# Patient Record
Sex: MALE | Race: WHITE | Employment: FULL TIME | URBAN - METROPOLITAN AREA
[De-identification: names, ages, dates, MRNs, and addresses within clinical notes are randomized per-mention and may not be internally consistent; named-entity substitution may affect disease eponyms.]

---

## 2017-08-07 ASSESSMENT — ENCOUNTER SYMPTOMS
SKIN CHANGES: 0
NAIL CHANGES: 0
POOR WOUND HEALING: 0

## 2017-08-16 ENCOUNTER — MYC REFILL (OUTPATIENT)
Dept: INTERNAL MEDICINE | Facility: CLINIC | Age: 32
End: 2017-08-16

## 2017-08-16 DIAGNOSIS — N52.01 ERECTILE DYSFUNCTION DUE TO ARTERIAL INSUFFICIENCY: ICD-10-CM

## 2017-08-16 DIAGNOSIS — F41.1 GAD (GENERALIZED ANXIETY DISORDER): ICD-10-CM

## 2017-08-16 ASSESSMENT — ACTIVITIES OF DAILY LIVING (ADL)
ARE_THERE_FIREARMS_IN_YOUR_HOME?: Y
DO_MEMBERS_OF_YOUR_HOUSEHOLD_USE_SUNSCREEN?: Y
DO_MEMBERS_OF_YOUR_HOUSEHOLD_USE_SAFETY_HELMETS?: N
DO_MEMBERS_OF_YOUR_HOUSEHOLD_WEAR_SEAT_BELTS?: Y
ARE_THERE_SMOKE_DETECTORS_IN_YOUR_HOME?: Y
ARE_THERE_CARBON_MONOXIDE_DETECTORS_IN_YOUR_HOME?: Y

## 2017-08-17 RX ORDER — LORAZEPAM 0.5 MG/1
0.5 TABLET ORAL DAILY PRN
Qty: 30 TABLET | Refills: 0 | Status: CANCELLED | OUTPATIENT
Start: 2017-08-17

## 2017-08-17 RX ORDER — SILDENAFIL 100 MG/1
100 TABLET, FILM COATED ORAL DAILY PRN
Qty: 16 TABLET | Refills: 4 | Status: CANCELLED | OUTPATIENT
Start: 2017-08-17

## 2017-08-17 NOTE — TELEPHONE ENCOUNTER
Message from MyChart:  Original authorizing provider: MD Matt Daly would like a refill of the following medications:  LORazepam (ATIVAN) 0.5 MG tablet [Messi Serna MD]    Preferred pharmacy: [facility] #[id number] - [place], [place] - [address]    Comment:

## 2017-08-17 NOTE — TELEPHONE ENCOUNTER
Lorazepam 0.5mg      Last Written Prescription Date:  5/24/16  Last Fill Quantity: 30,   # refills: 0  Last Office Visit with [facility] or  Health prescribing provider: 5/24/16  Future Office visit:       Pt does not have CSA on file.     Viagra 100mg      Last Written Prescription Date: 5/24/16  Last Fill Quantity: 16,  # refills: 4   Last Office Visit with [facility], [facility] or  [facility] prescribing provider: 5/24/16                                              Mychart message to pt advising him to schedule appt to establish care with a new provider. Pt advised he may need to be seen before refills will be authorized. Refill request routed to covering provider to review.    Routing refill request to provider for review/approval because:  Drug not on the [facility], [facility] or  [facility] refill protocol or controlled substance   Patient needs to be seen because it has been more than 1 year since last office visit.

## 2017-08-21 ENCOUNTER — OFFICE VISIT (OUTPATIENT)
Dept: INTERNAL MEDICINE | Facility: CLINIC | Age: 32
End: 2017-08-21

## 2017-08-21 VITALS
WEIGHT: 177.7 LBS | HEIGHT: 72 IN | TEMPERATURE: 97.4 F | OXYGEN SATURATION: 98 % | DIASTOLIC BLOOD PRESSURE: 76 MMHG | HEART RATE: 88 BPM | SYSTOLIC BLOOD PRESSURE: 110 MMHG | BODY MASS INDEX: 24.07 KG/M2

## 2017-08-21 DIAGNOSIS — R25.3 FLUTTERING MUSCLES: ICD-10-CM

## 2017-08-21 DIAGNOSIS — Z79.899 HIGH RISK MEDICATION USE: Primary | ICD-10-CM

## 2017-08-21 DIAGNOSIS — Z79.899 HIGH RISK MEDICATION USE: ICD-10-CM

## 2017-08-21 DIAGNOSIS — N52.01 ERECTILE DYSFUNCTION DUE TO ARTERIAL INSUFFICIENCY: ICD-10-CM

## 2017-08-21 DIAGNOSIS — F41.1 GAD (GENERALIZED ANXIETY DISORDER): ICD-10-CM

## 2017-08-21 DIAGNOSIS — B07.9 VIRAL WARTS, UNSPECIFIED TYPE: ICD-10-CM

## 2017-08-21 LAB
ALBUMIN SERPL-MCNC: 3.9 G/DL (ref 3.4–5)
ALP SERPL-CCNC: 82 U/L (ref 40–150)
ALT SERPL W P-5'-P-CCNC: 16 U/L (ref 0–70)
ANION GAP SERPL CALCULATED.3IONS-SCNC: 6 MMOL/L (ref 3–14)
AST SERPL W P-5'-P-CCNC: 16 U/L (ref 0–45)
BASOPHILS # BLD AUTO: 0 10E9/L (ref 0–0.2)
BASOPHILS NFR BLD AUTO: 0.5 %
BILIRUB SERPL-MCNC: 1.3 MG/DL (ref 0.2–1.3)
BUN SERPL-MCNC: 17 MG/DL (ref 7–30)
CALCIUM SERPL-MCNC: 8.8 MG/DL (ref 8.5–10.1)
CHLORIDE SERPL-SCNC: 102 MMOL/L (ref 94–109)
CHOLEST SERPL-MCNC: 110 MG/DL
CO2 SERPL-SCNC: 29 MMOL/L (ref 20–32)
CREAT SERPL-MCNC: 0.88 MG/DL (ref 0.66–1.25)
DIFFERENTIAL METHOD BLD: ABNORMAL
EOSINOPHIL # BLD AUTO: 0.1 10E9/L (ref 0–0.7)
EOSINOPHIL NFR BLD AUTO: 1.5 %
ERYTHROCYTE [DISTWIDTH] IN BLOOD BY AUTOMATED COUNT: 12.5 % (ref 10–15)
ERYTHROCYTE [SEDIMENTATION RATE] IN BLOOD BY WESTERGREN METHOD: 3 MM/H (ref 0–15)
GFR SERPL CREATININE-BSD FRML MDRD: >90 ML/MIN/1.7M2
GLUCOSE SERPL-MCNC: 98 MG/DL (ref 70–99)
HCT VFR BLD AUTO: 44.5 % (ref 40–53)
HDLC SERPL-MCNC: 42 MG/DL
HGB BLD-MCNC: 15.5 G/DL (ref 13.3–17.7)
IMM GRANULOCYTES # BLD: 0 10E9/L (ref 0–0.4)
IMM GRANULOCYTES NFR BLD: 0.3 %
INTERPRETATION ECG - MUSE: NORMAL
LDLC SERPL CALC-MCNC: 51 MG/DL
LYMPHOCYTES # BLD AUTO: 1.4 10E9/L (ref 0.8–5.3)
LYMPHOCYTES NFR BLD AUTO: 34.8 %
MCH RBC QN AUTO: 32.6 PG (ref 26.5–33)
MCHC RBC AUTO-ENTMCNC: 34.8 G/DL (ref 31.5–36.5)
MCV RBC AUTO: 94 FL (ref 78–100)
MONOCYTES # BLD AUTO: 0.5 10E9/L (ref 0–1.3)
MONOCYTES NFR BLD AUTO: 12.4 %
NEUTROPHILS # BLD AUTO: 2 10E9/L (ref 1.6–8.3)
NEUTROPHILS NFR BLD AUTO: 50.5 %
NONHDLC SERPL-MCNC: 69 MG/DL
NRBC # BLD AUTO: 0 10*3/UL
NRBC BLD AUTO-RTO: 0 /100
PLATELET # BLD AUTO: 119 10E9/L (ref 150–450)
POTASSIUM SERPL-SCNC: 3.7 MMOL/L (ref 3.4–5.3)
PROT SERPL-MCNC: 7.4 G/DL (ref 6.8–8.8)
RBC # BLD AUTO: 4.76 10E12/L (ref 4.4–5.9)
SODIUM SERPL-SCNC: 137 MMOL/L (ref 133–144)
TRIGL SERPL-MCNC: 89 MG/DL
TSH SERPL DL<=0.005 MIU/L-ACNC: 1.58 MU/L (ref 0.4–4)
WBC # BLD AUTO: 3.9 10E9/L (ref 4–11)

## 2017-08-21 RX ORDER — SILDENAFIL 100 MG/1
100 TABLET, FILM COATED ORAL DAILY PRN
Qty: 32 TABLET | Refills: 4 | Status: SHIPPED | OUTPATIENT
Start: 2017-08-21

## 2017-08-21 RX ORDER — LORAZEPAM 0.5 MG/1
0.5 TABLET ORAL DAILY PRN
Qty: 30 TABLET | Refills: 0 | Status: SHIPPED | OUTPATIENT
Start: 2017-08-21 | End: 2018-10-02

## 2017-08-21 RX ORDER — EMTRICITABINE AND TENOFOVIR DISOPROXIL FUMARATE 200; 300 MG/1; MG/1
TABLET, FILM COATED ORAL
COMMUNITY

## 2017-08-21 ASSESSMENT — PAIN SCALES - GENERAL: PAINLEVEL: NO PAIN (0)

## 2017-08-21 NOTE — PROGRESS NOTES
"HPI  31-year-old male presents today to establish care and for annual physical examination. He has a couple of concerns. The first is a plantar wart on the bottom of his right great toe which has been present for several months. He's tried no home remedies to try and clear this up. He's had a history of warts in the past and these have typically resolved with liquid nitrogen. He's also experienced a several month history of \"fluttering\" this is in the substernal area it occurs spontaneously with no precipitating or alleviating factors that he has been able to identify. It'll last for several minutes and then spontaneously resolve. It occasionally is associated with a sensation of lightheadedness and dyspnea. Does not appear to be correlated with heartbeat. He is not aware of any rapid or irregular heart rhythms. It has not increased in frequency or severity and continues to occur several times during the day. It can occur with exercise but it doesn't stop him from exercising. There is no associated chest pain syncope or dizziness. Has not had any history of similar issues. He does have a history of anxiety for which he takes lorazepam on a p.r.n. basis less now by his report. He was last given 30 lorazepam back in May of last year and he is now requesting a refill. Otherwise he tries to be healthy with a focus on fruits and vegetables. No other complaints or concerns. Family history is significant for hypothyroidism in his mother.  Past Medical History:   Diagnosis Date     Acne      Anxiety      Mild major depression (H)      Past Surgical History:   Procedure Laterality Date     NONE       Family History   Problem Relation Age of Onset     Breast Cancer Mother      brest cancer @ age 31 and @ age60     Family History Negative Father      born 1954     Multiple Sclerosis Other      Family History Negative Brother      DIABETES Maternal Grandmother      Hypertension Maternal Grandmother      CANCER Maternal " "Grandfather      testicular     Family History Negative Paternal Grandmother      CANCER Paternal Grandfather      skin     Cardiovascular Paternal Grandfather      Social History     Social History     Marital status: Single     Spouse name: N/A     Number of children: N/A     Years of education: N/A     Social History Main Topics     Smoking status: Never Smoker     Smokeless tobacco: Never Used     Alcohol use Yes      Comment: socially     Drug use: No     Sexual activity: Yes     Other Topics Concern     None     Social History Narrative       Exam:  /76  Pulse 88  Temp 97.4  F (36.3  C) (Oral)  Ht 1.84 m (6' 0.44\")  Wt 80.6 kg (177 lb 11.2 oz)  SpO2 98%  BMI 23.81 kg/m2  177 lbs 11.2 oz  HPI    Past Medical History:   Diagnosis Date     Acne      Anxiety      Mild major depression (H)      Past Surgical History:   Procedure Laterality Date     NONE       Family History   Problem Relation Age of Onset     Breast Cancer Mother      brest cancer @ age 31 and @ age60     Family History Negative Father      born 1954     Multiple Sclerosis Other      Family History Negative Brother      DIABETES Maternal Grandmother      Hypertension Maternal Grandmother      CANCER Maternal Grandfather      testicular     Family History Negative Paternal Grandmother      CANCER Paternal Grandfather      skin     Cardiovascular Paternal Grandfather      Social History     Social History     Marital status: Single     Spouse name: N/A     Number of children: N/A     Years of education: N/A     Social History Main Topics     Smoking status: Never Smoker     Smokeless tobacco: Never Used     Alcohol use Yes      Comment: socially     Drug use: No     Sexual activity: Yes     Other Topics Concern     None     Social History Narrative     Answers for HPI/ROS submitted by the patient on 8/7/2017   Annual Exam:  General Symptoms: No  Skin Symptoms: Yes  HENT Symptoms: No  EYE SYMPTOMS: No  HEART SYMPTOMS: No  LUNG SYMPTOMS: " "No  INTESTINAL SYMPTOMS: No  URINARY SYMPTOMS: No  REPRODUCTIVE SYMPTOMS: No  SKELETAL SYMPTOMS: No  BLOOD SYMPTOMS: No  NERVOUS SYSTEM SYMPTOMS: No  MENTAL HEALTH SYMPTOMS: No  Changes in hair: No  Changes in moles/birth marks: No  Itching: No  Rashes: No  Changes in nails: No  Acne: No  Change in facial hair: No  Warts: Yes  Non-healing sores: No  Scarring: No  Flaking of skin: No  Color changes of hands/feet in cold : No  Sun sensitivity: No  Skin thickening: No  Frequency of exercise:: 2-3 days/week  Duration of exercise:: 30-45 minutes    Exam:  /76  Pulse 88  Temp 97.4  F (36.3  C) (Oral)  Ht 1.84 m (6' 0.44\")  Wt 80.6 kg (177 lb 11.2 oz)  SpO2 98%  BMI 23.81 kg/m2  177 lbs 11.2 oz  Physical Exam   The patient is alert, oriented with a clear sensorium.   Skin shows no lesions or rashes and good turgor.   Head is normocephalic and atraumatic.   Eyes show PERRLA with benign optic fundi.   Ears show normal TMs bilaterally.   Mouth shows clear oral mucosa.   Neck shows no nodes, thyromegaly or bruits but tender thyroid without nodules.   Back is non tender.  Lungs are clear to percussion and auscultation.   Heart shows normal S1 and S2 without murmur or gallop.   Abdomen is soft and nontender without masses or organomegaly.   Genitalia show normal testes. No evidence of inguinal hernia.  Extremities show no edema and no evidence of active synovitis, wart on rt great toe treated with liquid N2.   Neurologic examination shows cranial nerves II-XII intact. Motor shows 5/5 strength. Reflexes are symmetric. Cerebellar testing shows normal tandem gait.  Romberg negative.  EKG reviewed with him and was normal.      ASSESSMENT  1 suspect subacute thyroiditis responsible for his symptoms of \"fluttering\"  2 wart status post cryotherapy  3 chronic anxiety on occasional lorazepam  4 erectile dysfunction treated with sildenafil    Plan  I did refill his lorazepam and his sildenafil. Reassess his labs including " sedimentation rate and TSH in light of his thyroid tenderness. Reassured him that I would expect the symptoms to resolve over the next month or 2. If not may require additional investigation.  We'll update his immunizations with TDAP      Wilton Cunningham MD  General Internal Medicine  Primary Care Center  [phone]

## 2017-08-21 NOTE — MR AVS SNAPSHOT
After Visit Summary   8/21/2017    Matt Keita    MRN: [id number]           Patient Information     Date Of Birth          1985        Visit Information        Provider Department      8/21/2017 7:20 AM Wilton Cunningham MD [facility]        Today's Diagnoses     High risk medication use    -  1    DAMON (generalized anxiety disorder)        Erectile dysfunction due to arterial insufficiency        Fluttering muscles        Viral warts, unspecified type           Follow-ups after your visit        Your next 10 appointments already scheduled     Aug 21, 2017  8:30 AM CDT   LAB with  LAB   [facility] ([facility])    [address] [address] [phone]           Patient must bring picture ID. Patient should be prepared to give a urine specimen  Please do not eat 10-12 hours before your appointment if you are coming in fasting for labs on lipids, cholesterol, or glucose (sugar). Pregnant women should follow their Care Team instructions. Water with medications is okay. Do not drink coffee or other fluids. If you have concerns about taking  your medications, please ask at office or if scheduling via [employer], send a message by clicking on Secure Messaging, Message Your Care Team.              Future tests that were ordered for you today     Open Future Orders        Priority Expected Expires Ordered    TSH with free T4 reflex Routine  8/21/2018 8/21/2017    Erythrocyte sedimentation rate auto Routine  8/21/2018 8/21/2017    Lipid Profile Routine  8/21/2018 8/21/2017    Comprehensive metabolic panel Routine  8/21/2018 8/21/2017    CBC with platelets differential Routine  8/21/2018 8/21/2017            Who to contact     Please call your clinic at [phone] to:    Ask questions about your health    Make or cancel appointments    Discuss your medicines    Learn about your test results    Speak to your doctor  "  If you have compliments or concerns about an experience at your clinic, or if you wish to file a complaint, please contact [facility] Patient Relations at [phone] or email us at [email or web address]         Additional Information About Your Visit        MyChart Information     [employer] gives you secure access to your electronic health record. If you see a primary care provider, you can also send messages to your care team and make appointments. If you have questions, please call your primary care clinic.  If you do not have a primary care provider, please call [phone] and they will assist you.      [employer] is an electronic gateway that provides easy, online access to your medical records. With [employer], you can request a clinic appointment, read your test results, renew a prescription or communicate with your care team.     To access your existing account, please contact your [facility] or call [phone] for assistance.        Care EveryWhere ID     This is your Care EveryWhere ID. This could be used by other organizations to access your [place] medical records  [id number]        Your Vitals Were     Pulse Temperature Height Pulse Oximetry BMI (Body Mass Index)       88 97.4  F (36.3  C) (Oral) 1.84 m (6' 0.44\") 98% 23.81 kg/m2        Blood Pressure from Last 3 Encounters:   08/21/17 110/76   05/24/16 120/78   05/19/15 110/78    Weight from Last 3 Encounters:   08/21/17 80.6 kg (177 lb 11.2 oz)   05/24/16 80.7 kg (178 lb)   05/19/15 80.7 kg (178 lb)              We Performed the Following     EKG Performed in Clinic w/ Provider Reading Fee     TDAP VACCINE (BOOSTRIX)          Where to get your medicines      Some of these will need a paper prescription and others can be bought over the counter.  Ask your nurse if you have questions.     Bring a paper prescription for each of these medications     LORazepam 0.5 MG tablet    " sildenafil 100 MG cap/tab          Primary Care Provider Office Phone # Fax #    Messi Serna [phone]       XXX RESIGNED  [address] [facility] [address]  [facility] [address]        Equal Access to Services     HARDEEPKISHA MADALYN : Hadii sofia ku hadbonnieo Soomaali, waaxda luqadaha, qaybta kaalmada adeegyada, radha pradon rahul brady laYurimichael . So [facility] [phone].    ATENCIÓN: Si habla español, tiene a lopez disposición servicios gratuitos de asistencia lingüística. Llame al [phone].    We comply with applicable federal civil rights laws and [place] laws. We do not discriminate on the basis of race, color, national origin, age, disability sex, sexual orientation or gender identity.            Thank you!     Thank you for choosing [facility]  for your care. Our goal is always to provide you with excellent care. Hearing back from our patients is one way we can continue to improve our services. Please take a few minutes to complete the written survey that you may receive in the mail after your visit with us. Thank you!             Your Updated Medication List - Protect others around you: Learn how to safely use, store and throw away your medicines at www.disposemymeds.org.          This list is accurate as of: 8/21/17  8:05 AM.  Always use your most recent med list.                   Brand Name Dispense Instructions for use Diagnosis    LORazepam 0.5 MG tablet    ATIVAN    30 tablet    Take 1 tablet (0.5 mg) by mouth daily as needed for anxiety    DAMON (generalized anxiety disorder)       sildenafil 100 MG cap/tab    VIAGRA    32 tablet    Take 1 tablet (100 mg) by mouth daily as needed for erectile dysfunction    Erectile dysfunction due to arterial insufficiency       TRUVADA 200-300 MG per tablet   Generic drug:  emtricitabine-tenofovir       High risk medication use

## 2017-08-21 NOTE — NURSING NOTE
EKG preformed results to provider patient tolerated well. Gia Perera LPN at 8:20 AM on 8/21/2017.

## 2017-08-21 NOTE — NURSING NOTE
Patient received TDAP vaccine.  See immunization list for administration details.  Patient tolerated injection well.     Gia Salcedo at 8:19 AM on 8/21/2017.

## 2017-08-21 NOTE — PATIENT INSTRUCTIONS
[facility] Medication Refill Request Information:  * Please contact your pharmacy regarding ANY request for medication refills.  ** [facility] Prescription Fax = [phone]  * Please allow 3 business days for routine medication refills.  * Please allow 5 business days for controlled substance medication refills.     [facility] Test Result notification information:  *You will be notified with in 7-10 days of your appointment day regarding the results of your test.  If you are on MyChart you will be notified as soon as the provider has reviewed the results and signed off on them.    [facility] [phone]

## 2017-08-21 NOTE — NURSING NOTE
Chief Complaint   Patient presents with     Establish Care     Patient here to [facility], physical.     Jaylene Orozco LPN at 7:12 AM on 8/21/2017.

## 2017-10-19 ENCOUNTER — OFFICE VISIT (OUTPATIENT)
Dept: FAMILY MEDICINE | Facility: CLINIC | Age: 32
End: 2017-10-19
Payer: COMMERCIAL

## 2017-10-19 VITALS
BODY MASS INDEX: 24.73 KG/M2 | WEIGHT: 182.6 LBS | DIASTOLIC BLOOD PRESSURE: 72 MMHG | HEART RATE: 84 BPM | TEMPERATURE: 97.3 F | HEIGHT: 72 IN | OXYGEN SATURATION: 100 % | SYSTOLIC BLOOD PRESSURE: 126 MMHG

## 2017-10-19 DIAGNOSIS — Z23 FLU VACCINE NEED: ICD-10-CM

## 2017-10-19 DIAGNOSIS — B07.0 PLANTAR WART OF LEFT FOOT: Primary | ICD-10-CM

## 2017-10-19 PROCEDURE — 90471 IMMUNIZATION ADMIN: CPT | Performed by: FAMILY MEDICINE

## 2017-10-19 PROCEDURE — 90686 IIV4 VACC NO PRSV 0.5 ML IM: CPT | Performed by: FAMILY MEDICINE

## 2017-10-19 PROCEDURE — 17110 DESTRUCTION B9 LES UP TO 14: CPT | Performed by: FAMILY MEDICINE

## 2017-10-19 NOTE — PROGRESS NOTES
SUBJECTIVE:   Matt Keita is a 32 year old male who presents to clinic today for the following health issues:      Chief Complaint   Patient presents with     Wart     Follow Up, RT Big Toe      Patient has  tried over-the counter anti-wart medications.  There is no history of infection or injury.  This is the patient's second treatment.  Discussed various treatments and the patient elects for cryotherapy    O: The patient appears today in no apparent distress.  /72  Pulse 84  Temp 97.3  F (36.3  C) (Oral)  Ht 6' (1.829 m)  Wt 182 lb 9.6 oz (82.8 kg)  SpO2 100%  BMI 24.77 kg/m2  Skin: non-erythematous, raised papule(s) with pinpoint hemmorhages on 1.  Total number being treated today:     A: Common Wart(s).    P:  Each wart was frozen easily three times with liquid nitrogen. The etiology of common warts were discussed.  I recommend the  use the over-the-counter medications such as Compound W under occlusion after healing if there are any small lesions left. Return in about 3 weeks if retreatment seems necessary.

## 2017-10-19 NOTE — NURSING NOTE
Chief Complaint   Patient presents with     Wart     follow up, RT Big Toe       Initial /72  Pulse 84  Temp 97.3  F (36.3  C) (Oral)  Ht 6' (1.829 m)  Wt 182 lb 9.6 oz (82.8 kg)  SpO2 100%  BMI 24.77 kg/m2 Estimated body mass index is 24.77 kg/(m^2) as calculated from the following:    Height as of this encounter: 6' (1.829 m).    Weight as of this encounter: 182 lb 9.6 oz (82.8 kg).  Medication Reconciliation: complete      Health Maintenance that is potentially due pending provider review:  NONE    n/a    LUPILLO Prakash

## 2017-10-19 NOTE — MR AVS SNAPSHOT
After Visit Summary   10/19/2017    Matt Keita    MRN: [id number]           Patient Information     Date Of Birth          1985        Visit Information        Provider Department      10/19/2017 2:00 PM Amilcar Bloom MD [facility]        Today's Diagnoses     Plantar wart of left foot    -  1    Flu vaccine need           Follow-ups after your visit        Who to contact     If you have questions or need follow up information about today's clinic visit or your schedule please contact [facility] directly at [phone].  Normal or non-critical lab and imaging results will be communicated to you by algranohart, letter or phone within 4 business days after the clinic has received the results. If you do not hear from us within 7 days, please contact the clinic through algranohart or phone. If you have a critical or abnormal lab result, we will notify you by phone as soon as possible.  Submit refill requests through [employer] or call your pharmacy and they will forward the refill request to us. Please allow 3 business days for your refill to be completed.          Additional Information About Your Visit        MyChart Information     [employer] gives you secure access to your electronic health record. If you see a primary care provider, you can also send messages to your care team and make appointments. If you have questions, please call your primary care clinic.  If you do not have a primary care provider, please call [phone] and they will assist you.        Care EveryWhere ID     This is your Care EveryWhere ID. This could be used by other organizations to access your [place] medical records  [id number]        Your Vitals Were     Pulse Temperature Height Pulse Oximetry BMI (Body Mass Index)       84 97.3  F (36.3  C) (Oral) 6' (1.829 m) 100% 24.77 kg/m2        Blood Pressure from Last 3 Encounters:   10/19/17 126/72   08/21/17 110/76   05/24/16 120/78    Weight  from Last 3 Encounters:   10/19/17 182 lb 9.6 oz (82.8 kg)   08/21/17 177 lb 11.2 oz (80.6 kg)   05/24/16 178 lb (80.7 kg)              We Performed the Following     DESTRUCT BENIGN LESION, UP TO 14     HC FLU VAC PRESRV FREE QUAD SPLIT VIR 3+YRS IM        Primary Care Provider Office Phone # Fax #    Wilton Helder Cunningham [phone]       [address] [place] [address]        Equal Access to Services     KISHA [facility] CenterTRACI : Hadii aad ku hadasho Soomaali, waaxda luqadaha, qaybta kaalmada adeegyada, waxmacie iglesias . So [facility] [phone].    ATENCIÓN: Si habla español, tiene a lopez disposición servicios gratuitos de asistencia lingüística. LlTri[facility] [phone].    We comply with applicable federal civil rights laws and [place] laws. We do not discriminate on the basis of race, color, national origin, age, disability, sex, sexual orientation, or gender identity.            Thank you!     Thank you for choosing [facility]  for your care. Our goal is always to provide you with excellent care. Hearing back from our patients is one way we can continue to improve our services. Please take a few minutes to complete the written survey that you may receive in the mail after your visit with us. Thank you!             Your Updated Medication List - Protect others around you: Learn how to safely use, store and throw away your medicines at www.disposemymeds.org.          This list is accurate as of: 10/19/17 11:59 PM.  Always use your most recent med list.                   Brand Name Dispense Instructions for use Diagnosis    LORazepam 0.5 MG tablet    ATIVAN    30 tablet    Take 1 tablet (0.5 mg) by mouth daily as needed for anxiety    DAMON (generalized anxiety disorder)       sildenafil 100 MG tablet    VIAGRA    32 tablet    Take 1 tablet (100 mg) by mouth daily as needed for erectile dysfunction    Erectile dysfunction due to arterial insufficiency        TRUVADA 200-300 MG per tablet   Generic drug:  emtricitabine-tenofovir       High risk medication use

## 2018-08-01 ENCOUNTER — HOSPITAL ENCOUNTER (EMERGENCY)
Facility: CLINIC | Age: 33
Discharge: HOME | End: 2018-08-01
Attending: EMERGENCY MEDICINE | Admitting: EMERGENCY MEDICINE
Payer: COMMERCIAL

## 2018-08-01 VITALS
SYSTOLIC BLOOD PRESSURE: 131 MMHG | HEIGHT: 72 IN | BODY MASS INDEX: 23.57 KG/M2 | RESPIRATION RATE: 18 BRPM | WEIGHT: 174 LBS | OXYGEN SATURATION: 100 % | HEART RATE: 88 BPM | DIASTOLIC BLOOD PRESSURE: 88 MMHG | TEMPERATURE: 98.3 F

## 2018-08-01 DIAGNOSIS — N48.89 PENILE BLEEDING: ICD-10-CM

## 2018-08-01 PROCEDURE — 99282 EMERGENCY DEPT VISIT SF MDM: CPT | Performed by: EMERGENCY MEDICINE

## 2018-08-01 PROCEDURE — 99282 EMERGENCY DEPT VISIT SF MDM: CPT | Mod: Z6 | Performed by: EMERGENCY MEDICINE

## 2018-08-01 ASSESSMENT — ENCOUNTER SYMPTOMS
HEMATURIA: 0
DYSURIA: 0
DIFFICULTY URINATING: 0

## 2018-08-01 NOTE — ED TRIAGE NOTES
Pt arrived to the ER after pt had bloody ejaculation after intercourse with his partner about an hour ago. Pt states that his bleeding stopped and was able to urinate here in the ER without pain. Pt reports taking sildenafil yesterday around 9pm. Pt does take truvada due to Pt's partner being exposed and is on triumeq for about 2 years now. VSS And afebrile.

## 2018-08-01 NOTE — DISCHARGE INSTRUCTIONS
Thank you for your patience today.  Please follow-up with your regular doctor in the next 2-3 days for further evaluation and follow-up care.  Please call to schedule an appointment.  Please continue your own medications.  Please return to the ER if you develop high fever, scrotal pain, drainage from your penis, or any worsening of your current symptoms.  It was a pleasure taking care of you today.  We hope you feel better soon.

## 2018-08-01 NOTE — ED AVS SNAPSHOT
[facility] [place], Emergency Department    [address] [place] [address]    Phone:  [phone]                                       Matt Keita   MRN: [id number]    Department:  [facility], Emergency Department   Date of Visit:  8/1/2018           After Visit Summary Signature Page     I have received my discharge instructions, and my questions have been answered. I have discussed any challenges I see with this plan with the nurse or doctor.    ..........................................................................................................................................  Patient/Patient Representative Signature      ..........................................................................................................................................  Patient Representative Print Name and Relationship to Patient    ..................................................               ................................................  Date                                            Time    ..........................................................................................................................................  Reviewed by Signature/Title    ...................................................              ..............................................  Date                                                            Time

## 2018-08-01 NOTE — ED AVS SNAPSHOT
[facility], Emergency Department    [address] [address]    Phone:  [phone]                                       Matt Keita   MRN: [id number]    Department:  [facility], Emergency Department   Date of Visit:  8/1/2018           Patient Information     Date Of Birth          1985        Your diagnoses for this visit were:     Penile bleeding        You were seen by Cherelle Ashraf MD.        Discharge Instructions       Thank you for your patience today.  Please follow-up with your regular doctor in the next 2-3 days for further evaluation and follow-up care.  Please call to schedule an appointment.  Please continue your own medications.  Please return to the ER if you develop high fever, scrotal pain, drainage from your penis, or any worsening of your current symptoms.  It was a pleasure taking care of you today.  We hope you feel better soon.      24 Hour Appointment Hotline       To make an appointment at any [facility] clinic, call [phone] ([phone]). If you don't have a family doctor or clinic, we will help you find one. [facility] clinics are conveniently located to serve the needs of you and your family.             Review of your medicines      Our records show that you are taking the medicines listed below. If these are incorrect, please call your family doctor or clinic.        Dose / Directions Last dose taken    LORazepam 0.5 MG tablet   Commonly known as:  ATIVAN   Dose:  0.5 mg   Quantity:  30 tablet        Take 1 tablet (0.5 mg) by mouth daily as needed for anxiety   Refills:  0        sildenafil 100 MG tablet   Commonly known as:  VIAGRA   Dose:  100 mg   Quantity:  32 tablet        Take 1 tablet (100 mg) by mouth daily as needed for erectile dysfunction   Refills:  4        TRUVADA 200-300 MG per tablet   Generic drug:  emtricitabine-tenofovir        Refills:  0                Orders Needing Specimen Collection     None      Pending Results      No orders found from 7/30/2018 to 8/2/2018.            Pending Culture Results     No orders found from 7/30/2018 to 8/2/2018.            Pending Results Instructions     If you had any lab results that were not finalized at the time of your Discharge, you can call the ED Lab Result RN at [phone]. You will be contacted by this team for any positive Lab results or changes in treatment. The nurses are available 7 days a week from 10A to 6:30P.  You can leave a message 24 hours per day and they will return your call.        Thank you for choosing [facility]       Thank you for choosing [facility] for your care. Our goal is always to provide you with excellent care. Hearing back from our patients is one way we can continue to improve our services. Please take a few minutes to complete the written survey that you may receive in the mail after you visit with us. Thank you!        [employer] Information     [employer] gives you secure access to your electronic health record. If you see a primary care provider, you can also send messages to your care team and make appointments. If you have questions, please call your primary care clinic.  If you do not have a primary care provider, please call [phone] and they will assist you.        Care EveryWhere ID     This is your Care EveryWhere ID. This could be used by other organizations to access your [facility] medical records  [id number]        Equal Access to Services     NATHANIEL BERGMAN : Yariel Costello, wasigifredoda luqadaha, qaybta kaalrashard mayorga, radha arana. So [facility] [phone].    ATENCIÓN: Si habla español, tiene a lopez disposición servicios gratuitos de asistencia lingüística. Llame al [phone].    We comply with applicable federal civil rights laws and [place] laws. We do not discriminate on the basis of race, color, national origin, age, disability, sex, sexual orientation, or gender identity.            After Visit  Summary       This is your record. Keep this with you and show to your community pharmacist(s) and doctor(s) at your next visit.

## 2018-08-01 NOTE — ED PROVIDER NOTES
"  History     Chief Complaint   Patient presents with     Penile Discharge     blood in penis     The history is provided by the patient.     Matt Keita is a 32 year old male with a history of depression, and anxiety who presents to the Emergency Department who presents for evaluation of blood and pain when ejaculating this evening. The patient reports having unprotected intercourse with his boyfriend which triggered his symptoms at climax which also involved a \"popping\" sensation when pulling out. Adding pressure causes discomfort. The patient called the nurse line and was recommended to go to the ED. This has not happened to him in the past. The boyfriend is HIV+ undetectable and is being treated, the patient is also on PrEP and both have missed a dose recently.  The patient used Viagra to enhance pleasure an hour prior intercourse, this was not his first time.The patient denies current pain, denies bruising. He denies traumatic injuries or trauma. He denies dysuria, hematuria or difficulty urinating. The patient has had epididymitis in the past.     I have reviewed the Medications, Allergies, Past Medical and Surgical History, and Social History in the [employer]easydocs system.  Past Medical History:   Diagnosis Date     Acne      Anxiety      Leukopenia      Mild major depression (H)        Past Surgical History:   Procedure Laterality Date     BIOPSY      bone marrow     NONE         Family History   Problem Relation Age of Onset     Breast Cancer Mother      brest cancer @ age 31 and @ age60     Family History Negative Father      born 1954     Multiple Sclerosis Other      Family History Negative Brother      Diabetes Maternal Grandmother      Hypertension Maternal Grandmother      Cancer Maternal Grandfather      testicular     Family History Negative Paternal Grandmother      Cancer Paternal Grandfather      skin     Cardiovascular Paternal Grandfather        Social History   Substance Use Topics     Smoking " status: Never Smoker     Smokeless tobacco: Never Used     Alcohol use Yes      Comment: socially       No current facility-administered medications for this encounter.      Current Outpatient Prescriptions   Medication     emtricitabine-tenofovir (TRUVADA) 200-300 MG per tablet     sildenafil (VIAGRA) 100 MG cap/tab     LORazepam (ATIVAN) 0.5 MG tablet        Allergies   Allergen Reactions     Nkda [No Known Drug Allergies]        Review of Systems   Genitourinary: Positive for penile pain (bloody ejaculation). Negative for difficulty urinating, dysuria and hematuria.   All other systems reviewed and are negative.      Physical Exam   BP: 131/88  Pulse: 88  Temp: 98.3  F (36.8  C)  Resp: 18  Height: 182.9 cm (6')  Weight: 78.9 kg (174 lb)  SpO2: 100 %      Physical Exam   Constitutional: He is oriented to person, place, and time. He appears well-developed. No distress.   HENT:   Head: Normocephalic and atraumatic.   Right Ear: External ear normal.   Left Ear: External ear normal.   Mouth/Throat: Oropharynx is clear and moist.   Eyes: Conjunctivae and EOM are normal. Pupils are equal, round, and reactive to light.   Neck: Normal range of motion. Neck supple.   Cardiovascular: Normal rate, regular rhythm and normal heart sounds.    Pulmonary/Chest: Effort normal and breath sounds normal. No respiratory distress. He has no wheezes. He has no rales.   Abdominal: Soft. He exhibits no distension. There is no tenderness. There is no rebound and no guarding.   Genitourinary: Penis normal. No penile tenderness.   Genitourinary Comments: No penile discharge, no blood at the meatus, penis nontender to palpation with no swelling, no erythema, no ecchymosis, testicles with no swelling, mass, tenderness to palpation.   Musculoskeletal: Normal range of motion. He exhibits no tenderness or deformity.   Neurological: He is alert and oriented to person, place, and time. No cranial nerve deficit. Coordination normal.   Skin: Skin is  warm and dry. No rash noted.   Psychiatric: He has a normal mood and affect. His behavior is normal.       ED Course     ED Course     Procedures             Critical Care time:  none             Labs Ordered and Resulted from Time of ED Arrival Up to the Time of Departure from the ED - No data to display         Assessments & Plan (with Medical Decision Making)     Matt Keita is a 32 year old male with a history of depression, and anxiety who presents to the Emergency Department who presents for evaluation of blood and pain when ejaculating this evening.  Upon arrival patient is well-appearing, afebrile, no distress.  On examination patient with normal penis, no blood at the meatus, no discharge, no tenderness to palpation, no ecchymosis, erythema.  No signs of any trauma, fracture, patient urinating with no difficulty with no blood in the urine. Clinical patient pain/intense popping sensation with ejaculation with no evidence of trauma or dysfunction. At this time no emergent need for antibiotics, imaging.  Discussed with patient to continue his prophylaxis, encourage safe sex with condoms, recommend follow-up with his primary doctor and return if painful erections, swelling, bruising, hematuria or any worsening symptoms.  Patient understands agrees with plan. .The patient is discharged home with instructions to return if their symptoms persist or worsen.  Plan for close follow-up with their primary physician.  I discussed workup, results, treatment, and plan with the patient.  Patient understands and agrees with the plan.      I have reviewed the nursing notes.    I have reviewed the findings, diagnosis, plan and need for follow up with the patient.    Discharge Medication List as of 8/1/2018  1:43 AM          Final diagnoses:   Penile bleeding     I, Erica Brito, am serving as a trained medical scribe to document services personally performed by Cherelle Ashraf MD, based on the provider's statements to  me.   I, Cherelle Ashraf MD, was physically present and have reviewed and verified the accuracy of this note documented by Erica Brito.    8/1/2018   [facility], [place], EMERGENCY DEPARTMENT     Cherelle Ashraf MD  08/01/18 0224

## 2018-09-15 ASSESSMENT — ENCOUNTER SYMPTOMS
VOMITING: 0
DIARRHEA: 0
RECTAL PAIN: 1
NAUSEA: 0
JAUNDICE: 0
PANIC: 0
HYPERTENSION: 0
SLEEP DISTURBANCES DUE TO BREATHING: 0
ORTHOPNEA: 0
BLOATING: 0
DECREASED CONCENTRATION: 0
ABDOMINAL PAIN: 0
LIGHT-HEADEDNESS: 0
HEARTBURN: 0
SYNCOPE: 0
PALPITATIONS: 1
HYPOTENSION: 0
BOWEL INCONTINENCE: 0
LEG PAIN: 0
CONSTIPATION: 0
INSOMNIA: 0
BLOOD IN STOOL: 0
EXERCISE INTOLERANCE: 0
NERVOUS/ANXIOUS: 1
DEPRESSION: 0

## 2018-09-19 ENCOUNTER — OFFICE VISIT (OUTPATIENT)
Dept: INTERNAL MEDICINE | Facility: CLINIC | Age: 33
End: 2018-09-19
Payer: COMMERCIAL

## 2018-09-19 VITALS
TEMPERATURE: 98.1 F | BODY MASS INDEX: 23.68 KG/M2 | OXYGEN SATURATION: 100 % | HEART RATE: 89 BPM | WEIGHT: 174.8 LBS | HEIGHT: 72 IN | RESPIRATION RATE: 18 BRPM | SYSTOLIC BLOOD PRESSURE: 123 MMHG | DIASTOLIC BLOOD PRESSURE: 81 MMHG

## 2018-09-19 DIAGNOSIS — B07.0 PLANTAR WARTS: ICD-10-CM

## 2018-09-19 DIAGNOSIS — Z00.00 ROUTINE HEALTH MAINTENANCE: ICD-10-CM

## 2018-09-19 DIAGNOSIS — K62.89 RECTAL PAIN: ICD-10-CM

## 2018-09-19 DIAGNOSIS — F41.1 GAD (GENERALIZED ANXIETY DISORDER): ICD-10-CM

## 2018-09-19 DIAGNOSIS — E78.5 HYPERLIPIDEMIA, UNSPECIFIED HYPERLIPIDEMIA TYPE: ICD-10-CM

## 2018-09-19 DIAGNOSIS — E78.5 HYPERLIPIDEMIA, UNSPECIFIED HYPERLIPIDEMIA TYPE: Primary | ICD-10-CM

## 2018-09-19 LAB
ALBUMIN SERPL-MCNC: 4.2 G/DL (ref 3.4–5)
ALP SERPL-CCNC: 69 U/L (ref 40–150)
ALT SERPL W P-5'-P-CCNC: 16 U/L (ref 0–70)
AST SERPL W P-5'-P-CCNC: 12 U/L (ref 0–45)
BILIRUB DIRECT SERPL-MCNC: 0.2 MG/DL (ref 0–0.2)
BILIRUB SERPL-MCNC: 1 MG/DL (ref 0.2–1.3)
CHOLEST SERPL-MCNC: 126 MG/DL
ERYTHROCYTE [DISTWIDTH] IN BLOOD BY AUTOMATED COUNT: 12.5 % (ref 10–15)
HCT VFR BLD AUTO: 46 % (ref 40–53)
HDLC SERPL-MCNC: 46 MG/DL
HGB BLD-MCNC: 16 G/DL (ref 13.3–17.7)
LDLC SERPL CALC-MCNC: 63 MG/DL
MCH RBC QN AUTO: 32.1 PG (ref 26.5–33)
MCHC RBC AUTO-ENTMCNC: 34.8 G/DL (ref 31.5–36.5)
MCV RBC AUTO: 92 FL (ref 78–100)
NONHDLC SERPL-MCNC: 80 MG/DL
PLATELET # BLD AUTO: 167 10E9/L (ref 150–450)
PROT SERPL-MCNC: 7.9 G/DL (ref 6.8–8.8)
RBC # BLD AUTO: 4.99 10E12/L (ref 4.4–5.9)
TRIGL SERPL-MCNC: 88 MG/DL
WBC # BLD AUTO: 7.6 10E9/L (ref 4–11)

## 2018-09-19 RX ORDER — GABAPENTIN 100 MG/1
100 CAPSULE ORAL 3 TIMES DAILY
Qty: 270 CAPSULE | Refills: 3 | Status: SHIPPED | OUTPATIENT
Start: 2018-09-19

## 2018-09-19 RX ORDER — GABAPENTIN 100 MG/1
100 CAPSULE ORAL 3 TIMES DAILY
Qty: 270 CAPSULE | Refills: 3 | Status: SHIPPED | OUTPATIENT
Start: 2018-09-19 | End: 2018-09-19

## 2018-09-19 ASSESSMENT — PAIN SCALES - GENERAL: PAINLEVEL: MILD PAIN (3)

## 2018-09-19 NOTE — NURSING NOTE
Chief Complaint   Patient presents with     Physical     Patient is here for physical exam       Jimmy Kelley CMA (AAMA) at 5:31 PM on 9/19/2018

## 2018-09-19 NOTE — PROGRESS NOTES
HPI  33-year-old presents today for physical examination.  He has a couple of concerns.  First is that of generalized anxiety this is been an issue for him for the past several years.  He in the past has been intolerant of SSRIs related to their sexual side effects.  He found that bupropion was ineffective.  He found that venlafaxine actually aggravated the situation and made things worse.  He has a friend whose been taking gabapentin for anxiety with good effect and was inquiring regarding the use of gabapentin.  We reviewed potential side effects and he consented to a trial of this.  He is also had a plantars wart which he would like treated and he has had a one-week history of some rectal pain.  This was after he had receptive intercourse and was associated with an acute episode of pain which has persisted since that time but is gradually improving and decreasing in severity.  There is no associated diarrhea bleeding discharge fever chills sweats urinary symptoms drip discharge or other complaints.  Patient's not had any similar episodes in the past.  He is on Truvada is been tested for HIV and kidney function regularly through [facility].  He has had previous lipids done through his work and this has been associated with low HDL cholesterol by his report.  He is not doing anything in the way of exercise or activity is seen a counselor regarding the anxiety    Past Medical History:   Diagnosis Date     Acne      Anxiety      Leukopenia      Mild major depression (H)      Past Surgical History:   Procedure Laterality Date     BIOPSY      bone marrow     NONE       Family History   Problem Relation Age of Onset     Breast Cancer Mother      brest cancer @ age 31 and @ age60     Family History Negative Father      born 1954     Multiple Sclerosis Other      Family History Negative Brother      Diabetes Maternal Grandmother      Hypertension Maternal Grandmother      Cancer Maternal Grandfather      testicular     Family  "History Negative Paternal Grandmother      Cancer Paternal Grandfather      skin     Cardiovascular Paternal Grandfather      Social History     Social History     Marital status: Single     Spouse name: N/A     Number of children: N/A     Years of education: N/A     Social History Main Topics     Smoking status: Never Smoker     Smokeless tobacco: Never Used     Alcohol use Yes      Comment: socially     Drug use: No     Sexual activity: Yes     Partners: Male     Other Topics Concern     None     Social History Narrative     Answers for HPI/ROS submitted by the patient on 9/15/2018   General Symptoms: No  Skin Symptoms: No  HENT Symptoms: No  EYE SYMPTOMS: No  HEART SYMPTOMS: Yes  LUNG SYMPTOMS: No  INTESTINAL SYMPTOMS: Yes  URINARY SYMPTOMS: No  REPRODUCTIVE SYMPTOMS: No  SKELETAL SYMPTOMS: No  BLOOD SYMPTOMS: No  NERVOUS SYSTEM SYMPTOMS: No  MENTAL HEALTH SYMPTOMS: Yes  Chest pain or pressure: No  Fast or irregular heartbeat: Yes  Pain in legs with walking: No  Trouble breathing while lying down: No  Fingers or toes appear blue: No  High blood pressure: No  Low blood pressure: No  Fainting: No  Pacemaker: No  Varicose veins: No  Edema or swelling: No  Wake up at night with shortness of breath: No  Light-headedness: No  Exercise intolerance: No  Heart burn or indigestion: No  Nausea: No  Vomiting: No  Abdominal pain: No  Bloating: No  Constipation: No  Diarrhea: No  Blood in stool: No  Black stools: No  Rectal or Anal pain: Yes  Fecal incontinence: No  Yellowing of skin or eyes: No  Vomit with blood: No  Change in stools: No  Nervous or Anxious: Yes  Depression: No  Trouble sleeping: No  Trouble thinking or concentrating: No  Mood changes: No  Panic attacks: No      Exam:  /81 (BP Location: Right arm, Patient Position: Sitting, Cuff Size: Adult Regular)  Pulse 89  Temp 98.1  F (36.7  C) (Oral)  Resp 18  Ht 1.816 m (5' 11.5\")  Wt 79.3 kg (174 lb 12.8 oz)  SpO2 100%  BMI 24.04 kg/m2  174 lbs 12.8 " oz  Physical Exam   The patient is alert, oriented with a clear sensorium.   Skin shows no lesions or rashes and good turgor.   Head is normocephalic and atraumatic.   Eyes show PERRLA with benign optic fundi.   Ears show normal TMs bilaterally.   Mouth shows clear oral mucosa.   Neck shows no nodes, thyromegaly or bruits.   Back is non tender.  Lungs are clear to percussion and auscultation.   Heart shows normal S1 and S2 without murmur or gallop.   Abdomen is soft and nontender without masses or organomegaly.   Genitalia show normal testes. No evidence of inguinal hernia.  Rectal shows small smooth prostate without nodules or masses but some tenderness without any bogginess or swelling.  Extremities show no edema and no evidence of active synovitis 1 cm plantar's wart on the bottom of the right foot and heel treated with liquid nitrogen..   Neurologic examination shows cranial nerves II-XII intact. Motor shows 5/5 strength. Reflexes are symmetric. Cerebellar testing shows normal tandem gait.  Romberg negative.    ASSESSMENT  1 generalized anxiety disorder  2 rectal pain suspect related to mucosal injury or tear  3 plantars wart treated with liquid nitrogen  4 routine health maintenance    Plan he needs to be repacked sedated for meningococcus and will do this today.  We will check his fasting labs will give him another 2 weeks to see if the rectal pain does not resolve spontaneously with this and if not we will proceed with endoscopic evaluation of this.    This note was completed using Dragon voice recognition software.  Although reviewed after completion, some word and grammatical errors may occur.    Wilton Cunningham MD  General Internal Medicine  Primary Care Center  [phone]

## 2018-09-19 NOTE — NURSING NOTE
Administered Meningococcal Menactra vaccine (see Immunizations in Chart Review). Patient tolerated well.  Jimmy Kelley CMA at 6:30 PM on 9/19/2018

## 2018-09-19 NOTE — MR AVS SNAPSHOT
After Visit Summary   9/19/2018    Matt Keita    MRN: [id number]           Patient Information     Date Of Birth          1985        Visit Information        Provider Department      9/19/2018 5:45 PM Wilton Cunningham MD [facility]        Today's Diagnoses     Hyperlipidemia, unspecified hyperlipidemia type    -  1    Rectal pain        Routine health maintenance        DAMON (generalized anxiety disorder)           Follow-ups after your visit        Future tests that were ordered for you today     Open Future Orders        Priority Expected Expires Ordered    Lipid Profile Routine  9/19/2019 9/19/2018    Hepatic panel Routine  9/19/2019 9/19/2018    CBC with platelets Routine 9/19/2018 10/3/2018 9/19/2018            Who to contact     Please call your clinic at [phone] to:    Ask questions about your health    Make or cancel appointments    Discuss your medicines    Learn about your test results    Speak to your doctor            Additional Information About Your Visit        MyChart Information     [employer] gives you secure access to your electronic health record. If you see a primary care provider, you can also send messages to your care team and make appointments. If you have questions, please call your primary care clinic.  If you do not have a primary care provider, please call [phone] and they will assist you.      [employer] is an electronic gateway that provides easy, online access to your medical records. With [employer], you can request a clinic appointment, read your test results, renew a prescription or communicate with your care team.     To access your existing account, please contact your [facility] or call [phone] for assistance.        Care EveryWhere ID     This is your Care EveryWhere ID. This could be used by other organizations to access your [facility] medical records  [id number]        Your Vitals Were   "   Pulse Temperature Respirations Height Pulse Oximetry BMI (Body Mass Index)    89 98.1  F (36.7  C) (Oral) 18 1.816 m (5' 11.5\") 100% 24.04 kg/m2       Blood Pressure from Last 3 Encounters:   09/19/18 123/81   08/01/18 131/88   10/19/17 126/72    Weight from Last 3 Encounters:   09/19/18 79.3 kg (174 lb 12.8 oz)   08/01/18 78.9 kg (174 lb)   10/19/17 82.8 kg (182 lb 9.6 oz)              We Performed the Following     MENINGOCOCCAL VACCINE (MENACTRA), CONJUGATE          Today's Medication Changes          These changes are accurate as of 9/19/18  6:26 PM.  If you have any questions, ask your nurse or doctor.               Start taking these medicines.        Dose/Directions    gabapentin 100 MG capsule   Commonly known as:  NEURONTIN   Used for:  DAMON (generalized anxiety disorder)   Started by:  Wilton Cunningham MD        Dose:  100 mg   Take 1 capsule (100 mg) by mouth 3 times daily   Quantity:  270 capsule   Refills:  3            Where to get your medicines      These medications were sent to [facility] [address]  [address], [place] [id number]    Hours:  TRANSPLANT PHONE NUMBER [phone] Phone:  [phone]     gabapentin 100 MG capsule                Primary Care Provider Office Phone # Fax #    Wilton Cunningham [phone]       [address] [place] [address] [address]  [facility] [address]        Equal Access to Services     NATHANIEL BERGMAN AH: Hadjunior estradao Sogeorges, waaxda luqadaha, qaybta kaalmada adeegyada, radha arana. So [facility] [phone].    ATENCIÓN: Si habla español, tiene a lopez disposición servicios gratuitos de asistencia lingüística. Llame al [phone].    We comply with applicable federal civil rights laws and [place] laws. We do not discriminate on the basis of race, color, national origin, age, disability, sex, sexual orientation, or gender identity.       "      Thank you!     Thank you for choosing [facility]  for your care. Our goal is always to provide you with excellent care. Hearing back from our patients is one way we can continue to improve our services. Please take a few minutes to complete the written survey that you may receive in the mail after your visit with us. Thank you!             Your Updated Medication List - Protect others around you: Learn how to safely use, store and throw away your medicines at www.disposemymeds.org.          This list is accurate as of 9/19/18  6:26 PM.  Always use your most recent med list.                   Brand Name Dispense Instructions for use Diagnosis    gabapentin 100 MG capsule    NEURONTIN    270 capsule    Take 1 capsule (100 mg) by mouth 3 times daily    DAMON (generalized anxiety disorder)       LORazepam 0.5 MG tablet    ATIVAN    30 tablet    Take 1 tablet (0.5 mg) by mouth daily as needed for anxiety    DAMON (generalized anxiety disorder)       sildenafil 100 MG tablet    VIAGRA    32 tablet    Take 1 tablet (100 mg) by mouth daily as needed for erectile dysfunction    Erectile dysfunction due to arterial insufficiency       TRUVADA 200-300 MG per tablet   Generic drug:  emtricitabine-tenofovir       High risk medication use

## 2018-09-20 ENCOUNTER — MYC MEDICAL ADVICE (OUTPATIENT)
Dept: INTERNAL MEDICINE | Facility: CLINIC | Age: 33
End: 2018-09-20

## 2018-09-20 DIAGNOSIS — N41.9 PROSTATITIS, UNSPECIFIED PROSTATITIS TYPE: Primary | ICD-10-CM

## 2018-09-21 NOTE — TELEPHONE ENCOUNTER
"Patient contacted the clinic reporting urinary urgency as well as groin sensitivity post appointment with PCP. Pt would like to know if he can start antibiotics for possible prostate infection.    No discussion of antibiotics in PCP notes, discussed with covering provider who recommends an appointment for further evaluation, possible cultures.     I spoke to patient. He reported he does feel slightly improved from the other day, though reports he is still having symptoms. He denies fever, stated he has not noticed any cloudy or malodorous urine. He reported some urgency to void when he bends over, reports that his groin feels sensitive and irritated. No pain with urination, but reports that he does feel different with void, reports urethra feels \"sensitive\" with voiding. Patient reported he was painful with digital rectal exam. I informed patient that I did discuss with covering provider, she recommends a clinic appointment if symptoms do not improve. She is not comfortable prescribing any medications or labs. I informed patient I would also update PCP, though I advised him to go to urgent care if symptoms worsen over the weekend. He voiced understanding.     I will send message to PCP.    Margaret Rader RN  "

## 2018-09-24 RX ORDER — SULFAMETHOXAZOLE/TRIMETHOPRIM 800-160 MG
1 TABLET ORAL 2 TIMES DAILY
Qty: 28 TABLET | Refills: 0 | Status: SHIPPED | OUTPATIENT
Start: 2018-09-24

## 2018-09-25 NOTE — TELEPHONE ENCOUNTER
I spoke to Matt today as he had not read the [employer] message. He reports symptoms seem slightly better, though symptoms do still persist. I informed Matt of PCP's recommendation to start antibiotic course. He will pick this up from his pharmacy.    Margaret Rader RN

## 2018-10-02 ENCOUNTER — MYC MEDICAL ADVICE (OUTPATIENT)
Dept: INTERNAL MEDICINE | Facility: CLINIC | Age: 33
End: 2018-10-02

## 2018-10-02 ENCOUNTER — MYC REFILL (OUTPATIENT)
Dept: INTERNAL MEDICINE | Facility: CLINIC | Age: 33
End: 2018-10-02

## 2018-10-02 DIAGNOSIS — F41.1 GAD (GENERALIZED ANXIETY DISORDER): ICD-10-CM

## 2018-10-02 DIAGNOSIS — R39.9 LOWER URINARY TRACT SYMPTOMS (LUTS): Primary | ICD-10-CM

## 2018-10-02 RX ORDER — LORAZEPAM 0.5 MG/1
0.5 TABLET ORAL DAILY PRN
Qty: 30 TABLET | Refills: 0 | Status: SHIPPED | OUTPATIENT
Start: 2018-10-02

## 2018-10-02 RX ORDER — TAMSULOSIN HYDROCHLORIDE 0.4 MG/1
0.4 CAPSULE ORAL DAILY
Qty: 90 CAPSULE | Refills: 3 | Status: SHIPPED | OUTPATIENT
Start: 2018-10-02

## 2018-10-02 NOTE — TELEPHONE ENCOUNTER
Arthralgias and myalgias are reported with Bactrim.  Rhabdomyolysis has been reported rarely with Bactrim but mostly in patients with HIV (patient is on Atripla for PREP therapy only and is non-reactive).  Generally, this is a body-wide reaction that is not isolated to a particular body area.  Most times, people report these is large muscle groups. I would suspect this isn't related to the Bactrim as some of these symptoms were present prior to initiating therapy on 9/24.     Shayna Solorzano, Pharm.D., [facility]  Medication Therapy Management Pharmacist  Page/VM:  [phone]

## 2018-10-02 NOTE — TELEPHONE ENCOUNTER
site reviewed today. No lorazepam fills within the past year. Last prescribed 8/21/17 by PCP.    Margaret Rader RN    -------------    Rx faxed to [facility] #[id number].    Margraet Rader RN  10/2/2018 1:09 PM

## 2018-10-02 NOTE — TELEPHONE ENCOUNTER
Message from [facility]:  Malena Wu CMA Tue Oct 2, 2018 6:09 AM        ----- Message -----   From: Matt Keita   Sent: 10/2/2018 12:51 AM   To: [facility] Nursing Staff-  Subject: Medication Renewal Request     Original authorizing provider: MD Matt Wang would like a refill of the following medications:  LORazepam (ATIVAN) 0.5 MG tablet [Wilton Cunningham MD]    Preferred pharmacy: [id number] - [place], [place] - [address]    Comment:  Occasional use as needed

## 2018-10-02 NOTE — TELEPHONE ENCOUNTER
ativan  Last Written Prescription Date:  8/21/18  Last Fill Quantity: 30   # refills: 0  Last Office Visit : 9/19/18  Future Office visit:  No future appt    Routing refill request to RN for review/approval because:  Drug not on the [facility], P or [facility] refill protocol or controlled substance

## 2018-10-02 NOTE — TELEPHONE ENCOUNTER
Discussed with PCP. Recommendation for patient to start tamsulosin 0.4mg every evening, finish antibiotic, follow up in urology if no improvement.    Margaret Rader RN

## 2018-10-08 ENCOUNTER — PRE VISIT (OUTPATIENT)
Dept: UROLOGY | Facility: CLINIC | Age: 33
End: 2018-10-08

## 2018-10-24 ENCOUNTER — TRANSFERRED RECORDS (OUTPATIENT)
Dept: HEALTH INFORMATION MANAGEMENT | Facility: CLINIC | Age: 33
End: 2018-10-24

## 2018-11-19 ENCOUNTER — TELEPHONE (OUTPATIENT)
Dept: INTERNAL MEDICINE | Facility: CLINIC | Age: 33
End: 2018-11-19

## 2018-11-19 NOTE — TELEPHONE ENCOUNTER
[facility] Call Center    Phone Message    May a detailed message be left on voicemail: yes    Reason for Call: Other: Nicole from the [facility]. Medical Examiner's office called stating this Pt  on 18 and they would like Dr Cunningham to call them about his medical condition at [phone]     Action Taken: Message routed to:  [facility] ([facility]): He can ask for any examiner - please call as soon as possible

## 2019-11-07 ENCOUNTER — HEALTH MAINTENANCE LETTER (OUTPATIENT)
Age: 34
End: 2019-11-07

## 2020-11-29 ENCOUNTER — HEALTH MAINTENANCE LETTER (OUTPATIENT)
Age: 35
End: 2020-11-29

## 2021-09-25 ENCOUNTER — HEALTH MAINTENANCE LETTER (OUTPATIENT)
Age: 36
End: 2021-09-25

## 2022-01-15 ENCOUNTER — HEALTH MAINTENANCE LETTER (OUTPATIENT)
Age: 37
End: 2022-01-15

## 2022-12-26 ENCOUNTER — HEALTH MAINTENANCE LETTER (OUTPATIENT)
Age: 37
End: 2022-12-26

## 2023-04-16 ENCOUNTER — HEALTH MAINTENANCE LETTER (OUTPATIENT)
Age: 38
End: 2023-04-16